# Patient Record
Sex: MALE | Race: WHITE | NOT HISPANIC OR LATINO | Employment: FULL TIME | URBAN - METROPOLITAN AREA
[De-identification: names, ages, dates, MRNs, and addresses within clinical notes are randomized per-mention and may not be internally consistent; named-entity substitution may affect disease eponyms.]

---

## 2023-09-07 PROBLEM — K21.9 ESOPHAGEAL REFLUX: Status: ACTIVE | Noted: 2023-09-07

## 2023-09-07 PROBLEM — E78.01 ESSENTIAL FAMILIAL HYPERCHOLESTEROLEMIA: Status: ACTIVE | Noted: 2023-09-07

## 2023-09-07 PROBLEM — R06.83 PRIMARY SNORING: Status: ACTIVE | Noted: 2023-09-07

## 2023-09-07 PROBLEM — G47.33 OBSTRUCTIVE SLEEP APNEA SYNDROME: Status: ACTIVE | Noted: 2023-09-07

## 2023-09-07 PROBLEM — H53.149 PHOTOPHOBIA: Status: ACTIVE | Noted: 2023-09-07

## 2023-09-07 PROBLEM — G47.10 HYPERSOMNOLENCE: Status: ACTIVE | Noted: 2023-09-07

## 2023-09-07 PROBLEM — H33.302 RETINAL DEFECT, LEFT: Status: ACTIVE | Noted: 2023-09-07

## 2023-09-07 PROBLEM — H52.7 REFRACTIVE ERROR: Status: ACTIVE | Noted: 2023-09-07

## 2023-09-07 PROBLEM — E78.5 HYPERLIPIDEMIA: Status: ACTIVE | Noted: 2023-09-07

## 2023-09-07 PROBLEM — E66.9 OBESITY (BMI 30-39.9): Status: ACTIVE | Noted: 2023-09-07

## 2023-09-07 PROBLEM — M25.569 ARTHRALGIA OF LOWER LEG: Status: ACTIVE | Noted: 2023-09-07

## 2023-09-07 PROBLEM — M54.42 LUMBAGO WITH SCIATICA, LEFT SIDE: Status: ACTIVE | Noted: 2023-09-07

## 2023-09-07 RX ORDER — IBUPROFEN 200 MG
TABLET ORAL
COMMUNITY

## 2023-12-27 ENCOUNTER — TELEPHONE (OUTPATIENT)
Dept: PRIMARY CARE | Facility: CLINIC | Age: 33
End: 2023-12-27
Payer: COMMERCIAL

## 2023-12-27 NOTE — TELEPHONE ENCOUNTER
"PT requests appt for cough, cramping after eating. Was sick \"a couple  weeks ago\". Pt top take covid test and advise of results. Covid tests negative. Confirmed appt w/ N Sánchez.   "

## 2023-12-28 ENCOUNTER — OFFICE VISIT (OUTPATIENT)
Dept: PRIMARY CARE | Facility: CLINIC | Age: 33
End: 2023-12-28
Payer: COMMERCIAL

## 2023-12-28 VITALS
HEART RATE: 84 BPM | SYSTOLIC BLOOD PRESSURE: 130 MMHG | BODY MASS INDEX: 38.08 KG/M2 | OXYGEN SATURATION: 95 % | DIASTOLIC BLOOD PRESSURE: 70 MMHG | TEMPERATURE: 99.8 F | WEIGHT: 273 LBS

## 2023-12-28 DIAGNOSIS — B34.9 VIRAL ILLNESS: Primary | ICD-10-CM

## 2023-12-28 PROCEDURE — 99213 OFFICE O/P EST LOW 20 MIN: CPT | Performed by: LICENSED PRACTICAL NURSE

## 2023-12-28 PROCEDURE — 87634 RSV DNA/RNA AMP PROBE: CPT | Performed by: LICENSED PRACTICAL NURSE

## 2023-12-28 RX ORDER — OSELTAMIVIR PHOSPHATE 75 MG/1
75 CAPSULE ORAL 2 TIMES DAILY
Qty: 10 CAPSULE | Refills: 0 | Status: SHIPPED | OUTPATIENT
Start: 2023-12-28 | End: 2024-01-02

## 2023-12-28 ASSESSMENT — ENCOUNTER SYMPTOMS
CHILLS: 1
LOSS OF SENSATION IN FEET: 0
FEVER: 1
SORE THROAT: 0
OCCASIONAL FEELINGS OF UNSTEADINESS: 0
COUGH: 1
SINUS PAIN: 0
FATIGUE: 1
DEPRESSION: 1

## 2023-12-28 ASSESSMENT — PATIENT HEALTH QUESTIONNAIRE - PHQ9
2. FEELING DOWN, DEPRESSED OR HOPELESS: SEVERAL DAYS
SUM OF ALL RESPONSES TO PHQ9 QUESTIONS 1 AND 2: 2
1. LITTLE INTEREST OR PLEASURE IN DOING THINGS: SEVERAL DAYS

## 2023-12-28 ASSESSMENT — PAIN SCALES - GENERAL: PAINLEVEL: 6

## 2023-12-28 NOTE — PROGRESS NOTES
[facility]: [facility]  PROGRESS NOTE      James Rosario is a 33 y.o. male that is presenting today for Follow-up (Chest pain when they cough, chills, dark brown phlegm, metallic taste in mouth when coughing, been going on for 2 days now, covid tested twice and came back neg ).      Subjective   Pt presents to the office today for concerns of a cough that began 2 days ago. He notes CP when he coughs and minimally when he does not cough. His cough is productive with a dark brown sputum. He had a fever of 101 last night and reports chills as well. He notes bodyaches yesterday that seemed to have resolved today he also notes feeling fatigued. He reports having N/V/D 2 weeks ago that has since passed. He has tried some Dayquil yesterday that did not seem to help very much. He tested himself for Covid x2, both tests were negative.       Review of Systems   Constitutional:  Positive for chills, fatigue and fever.        Bodyaches   HENT:  Negative for ear pain, sinus pain and sore throat.    Respiratory:  Positive for cough.    Cardiovascular:  Positive for chest pain.      Objective   Vitals:    12/28/23 0919   BP: 130/70   Pulse: 84   Temp: 37.7 °C (99.8 °F)   SpO2: 95%      Body mass index is 38.08 kg/m².  Physical Exam  Vitals reviewed.   HENT:      Ears:      Comments: Slightly erythemic right TM. Bilateral non occlusive cerumen. No tenderness      Nose:      Right Sinus: Frontal sinus tenderness present.      Mouth/Throat:      Mouth: Mucous membranes are moist.      Pharynx: Oropharynx is clear. No pharyngeal swelling or posterior oropharyngeal erythema.   Cardiovascular:      Rate and Rhythm: Normal rate and regular rhythm.   Pulmonary:      Effort: Pulmonary effort is normal.      Breath sounds: Normal breath sounds.       Diagnostic Results   Lab Results   Component Value Date    GLUCOSE 101 (H) 01/11/2023    CALCIUM 9.6 01/11/2023     01/11/2023    K 4.4 01/11/2023    CO2 23 (L)  "01/11/2023     01/11/2023    BUN 17 01/11/2023    CREATININE 0.6 01/11/2023     Lab Results   Component Value Date    ALT 45 (H) 01/11/2023    AST 29 01/11/2023    ALKPHOS 86 01/11/2023    BILITOT 0.2 01/11/2023     Lab Results   Component Value Date    WBC 10.5 01/11/2023    HGB 15.2 01/11/2023    HCT 45.5 01/11/2023    MCV 92.7 01/11/2023     01/11/2023     Lab Results   Component Value Date    CHOL 181 01/11/2023    CHOL 192 01/09/2020     Lab Results   Component Value Date    HDL 28 (L) 01/11/2023    HDL 29 (L) 01/09/2020     Lab Results   Component Value Date    LDLCALC 93 01/11/2023    LDLCALC 135 (H) 01/09/2020     Lab Results   Component Value Date    TRIG 299 (H) 01/11/2023    TRIG 138 01/09/2020     No components found for: \"CHOLHDL\"  Lab Results   Component Value Date    HGBA1C 5.9 01/09/2020     Other labs not included in the list above were reviewed either before or during this encounter.    History    Past Medical History:   Diagnosis Date    Other specified health status 10/11/2014    No pertinent past medical history     Past Surgical History:   Procedure Laterality Date    ADENOIDECTOMY  07/16/2012    Adenoidectomy    OTHER SURGICAL HISTORY  07/16/2012    Ear Surgery Eustachian Tube     Family History   Problem Relation Name Age of Onset    Sleep apnea Mother      Other (sciatica) Mother      Hypertension Mother      Pulmonary embolism Mother      Diabetes Father      Blood clot Father      Sleep apnea Brother      No Known Problems Son      Stroke Mother's Sister      Throat cancer Paternal Grandfather       Social History     Socioeconomic History    Marital status: Single     Spouse name: Not on file    Number of children: Not on file    Years of education: Not on file    Highest education level: Not on file   Occupational History    Not on file   Tobacco Use    Smoking status: Never    Smokeless tobacco: Never   Vaping Use    Vaping Use: Former    Quit date: 12/3/2023    Substances: " Nicotine   Substance and Sexual Activity    Alcohol use: Not Currently    Drug use: Not Currently     Types: Marijuana    Sexual activity: Not on file   Other Topics Concern    Not on file   Social History Narrative    Not on file     Social Determinants of Health     Financial Resource Strain: Not on file   Food Insecurity: Not on file   Transportation Needs: Not on file   Physical Activity: Not on file   Stress: Not on file   Social Connections: Not on file   Intimate Partner Violence: Not on file   Housing Stability: Not on file     No Known Allergies  Current Outpatient Medications on File Prior to Visit   Medication Sig Dispense Refill    ibuprofen 200 mg tablet 1 tablet as needed Orally every 6 hrs       No current facility-administered medications on file prior to visit.     Immunization History   Administered Date(s) Administered    DTP 1988, 1988, 02/17/1989, 02/05/1990    Hepatitis B vaccine, pediatric/adolescent (RECOMBIVAX, ENGERIX) 10/07/1998, 11/27/1998, 04/23/1999    Influenza, injectable, quadrivalent 01/07/2020    MMR vaccine, subcutaneous (MMR II) 11/17/1989, 10/07/1998    OPV 1988, 1988, 02/05/1990    Pfizer Purple Cap SARS-CoV-2 01/16/2021, 02/06/2021, 01/06/2022    Tdap vaccine, age 7 year and older (BOOSTRIX) 12/24/2016, 03/27/2018     Patient's medical history was reviewed and updated either before or during this encounter.       Assessment/Plan   Problem List Items Addressed This Visit    None  Visit Diagnoses       Viral illness    -  Primary    Relevant Medications    oseltamivir (Tamiflu) 75 mg capsule    Other Relevant Orders    Influenza A, and B PCR    RSV PCR            Cristy Pozo, APRN-CNP

## 2023-12-28 NOTE — PATIENT INSTRUCTIONS
It was nice meeting you today. I'm sorry you are having difficulty with your symptoms. I will test you for the flu and RSV, your results will take some time to return. In the meantime I will treat you for the flu. For you cough you can take OTC mucinex if you like. These should help you some. If you feel a sudden increase in your chest pain please call  [phone].

## 2023-12-29 LAB
FLUAV RNA RESP QL NAA+PROBE: DETECTED
FLUBV RNA RESP QL NAA+PROBE: NOT DETECTED
RSV RNA RESP QL NAA+PROBE: NOT DETECTED

## 2024-07-29 ASSESSMENT — ENCOUNTER SYMPTOMS
DIARRHEA: 0
VOMITING: 0
COUGH: 0
APPETITE CHANGE: 0
FEVER: 0
HEADACHES: 0
NAUSEA: 0
SHORTNESS OF BREATH: 0
CHILLS: 0

## 2024-07-29 NOTE — PROGRESS NOTES
[facility]: [facility]  PHYSICAL EXAM      James Rosario is a 33 y.o. male that is presenting today for Annual Exam (Stomach issues in November, stomach has not been the same since).    Assessment/Plan   Diagnoses and all orders for this visit:  Annual physical exam  -     CBC and Auto Differential; Future  -     Comprehensive Metabolic Panel; Future  -     Lipid Panel; Future  -     Hemoglobin A1C; Future  -     Vitamin D 25-Hydroxy,Total (for eval of Vitamin D levels); Future  -     TSH with reflex to Free T4 if abnormal; Future  Mixed hyperlipidemia  -     CBC and Auto Differential; Future  -     Comprehensive Metabolic Panel; Future  -     Lipid Panel; Future  Hyperglycemia  -     Hemoglobin A1C; Future  Vitamin D deficiency  -     Vitamin D 25-Hydroxy,Total (for eval of Vitamin D levels); Future  Chronic pain of both knees  -     XR knee 1-2 views bilateral; Future  Dyspepsia  -     omeprazole OTC (PriLOSEC OTC) 20 mg EC tablet; Take 1 tablet (20 mg) by mouth once daily. Do not crush, chew, or split.  -     dicyclomine (Bentyl) 20 mg tablet; Take 1 tablet (20 mg) by mouth 4 times a day as needed (abdominal pain or cramps).  -     Referral to Gastroenterology; Future  Other orders  -     Follow Up In Primary Care - Health Maintenance; Future    Would like to get the laboratory studies updated now.  Would like to start omeprazole daily for possible dyspepsia, dicyclomine as needed for the spasms.  Gave him a referral to gastroenterology.  His symptoms are definitely better at this point he is no longer losing weight but I told him that if he does not have continued improvement and certainly if the medications do not help him that he should see the gastroenterologist.    I would also like to x-ray the knees given the increased pain in the knees even after the weight loss.    Subjective   HPI  The patient is here for physical exam and follow up.  We reviewed the medical, family and social  history as outlined below.  We reviewed any currently prescribed medications.  We reviewed the screening and prevention schedule in detail.    The patient denies chest pain and shortness of breath.  No exertion-proved or anginal-type symptoms are reported.    Over the past several months he has been having stomach issues, stating that he feels like he gets cramps or spasms in the stomach.  The bowels do not really seem to be affected at all and he is not complaining of diarrhea.  Denies melena, hematochezia.  It has caused him to reduce when he is eating and he has lost quite a bit of weight.  He was 286 pounds in January 2023 and is at 269 pounds today.  He says the weight loss is definitely leveled off and his symptoms are much better.  However, he continues to very intermittently have the issues with stomach pain and cramping.  He has no typical symptoms of acid reflux.  He says that on 2 occasions he drank just a glass of milk and then a glass of water and it caused his stomach to hurt and he felt that he had to expel gas.    He is also had bilateral knee pain which has been ongoing for some time and he thought it would get better with the weight loss but it is actually worse.  Sometimes when he squats he feels like his knees are going to give out.  There is no preceding injury that he knows of.  He does not recall anybody in the family having had inflammatory arthritis or any bone or joint issues.    Review of Systems   Constitutional:  Positive for unexpected weight change. Negative for appetite change, chills and fever.   Respiratory:  Negative for cough and shortness of breath.    Cardiovascular:  Negative for chest pain.   Gastrointestinal:  Positive for abdominal pain. Negative for anal bleeding, blood in stool, constipation, diarrhea, nausea, rectal pain and vomiting.   Neurological:  Negative for headaches.   All other systems reviewed and are negative.     Objective   Vitals:    07/30/24 0935   BP:  122/80   Pulse: 80   Temp: 36.3 °C (97.4 °F)   SpO2: 95%     Body mass index is 36.48 kg/m².  Physical Exam  Vitals reviewed.   Constitutional:       General: He is not in acute distress.     Appearance: He is not toxic-appearing.   HENT:      Head: Normocephalic and atraumatic.      Mouth/Throat:      Mouth: Mucous membranes are moist.   Eyes:      Pupils: Pupils are equal, round, and reactive to light.   Cardiovascular:      Rate and Rhythm: Normal rate and regular rhythm.      Heart sounds: No murmur heard.  Pulmonary:      Breath sounds: Normal breath sounds. No wheezing, rhonchi or rales.   Abdominal:      General: There is no distension.      Palpations: Abdomen is soft.   Musculoskeletal:      Right lower leg: No edema.      Left lower leg: No edema.   Neurological:      General: No focal deficit present.      Mental Status: He is alert and oriented to person, place, and time.     He is full range of motion without crepitus.  No laxity.  Overall joints show very minimal degenerative changes, no synovitis is appreciated.    Diagnostic Results   Lab Results   Component Value Date    GLUCOSE 101 (H) 01/11/2023    CALCIUM 9.6 01/11/2023     01/11/2023    K 4.4 01/11/2023    CO2 23 (L) 01/11/2023     01/11/2023    BUN 17 01/11/2023    CREATININE 0.6 01/11/2023     Lab Results   Component Value Date    ALT 45 (H) 01/11/2023    AST 29 01/11/2023    ALKPHOS 86 01/11/2023    BILITOT 0.2 01/11/2023     Lab Results   Component Value Date    WBC 10.5 01/11/2023    HGB 15.2 01/11/2023    HCT 45.5 01/11/2023    MCV 92.7 01/11/2023     01/11/2023     Lab Results   Component Value Date    CHOL 181 01/11/2023    CHOL 192 01/09/2020     Lab Results   Component Value Date    HDL 28 (L) 01/11/2023    HDL 29 (L) 01/09/2020     Lab Results   Component Value Date    LDLCALC 93 01/11/2023    LDLCALC 135 (H) 01/09/2020     Lab Results   Component Value Date    TRIG 299 (H) 01/11/2023    TRIG 138 01/09/2020     No  "components found for: \"CHOLHDL\"  Lab Results   Component Value Date    HGBA1C 5.9 01/09/2020     Other labs not included in the list above were reviewed either before or during this encounter.    History   Past Medical History:   Diagnosis Date    Other specified health status 10/11/2014    No pertinent past medical history     Past Surgical History:   Procedure Laterality Date    ADENOIDECTOMY  07/16/2012    Adenoidectomy    OTHER SURGICAL HISTORY  07/16/2012    Ear Surgery Eustachian Tube     Family History   Problem Relation Name Age of Onset    Sleep apnea Mother      Other (sciatica) Mother      Hypertension Mother      Pulmonary embolism Mother      Diabetes Father      Blood clot Father      Sleep apnea Brother      No Known Problems Son      Stroke Mother's Sister      Throat cancer Paternal Grandfather       Social History     Socioeconomic History    Marital status: Single     Spouse name: Not on file    Number of children: Not on file    Years of education: Not on file    Highest education level: Not on file   Occupational History    Not on file   Tobacco Use    Smoking status: Never    Smokeless tobacco: Never   Vaping Use    Vaping status: Former    Quit date: 12/3/2023    Substances: Nicotine   Substance and Sexual Activity    Alcohol use: Not Currently    Drug use: Not Currently     Types: Marijuana    Sexual activity: Not on file   Other Topics Concern    Not on file   Social History Narrative    Not on file     Social Determinants of Health     Financial Resource Strain: Not on file   Food Insecurity: Not on file   Transportation Needs: Not on file   Physical Activity: Not on file   Stress: Not on file   Social Connections: Not on file   Intimate Partner Violence: Not on file   Housing Stability: Not on file     No Known Allergies  Current Outpatient Medications on File Prior to Visit   Medication Sig Dispense Refill    ibuprofen 200 mg tablet 1 tablet as needed Orally every 6 hrs       No current " facility-administered medications on file prior to visit.     Immunization History   Administered Date(s) Administered    DTP 1988, 1988, 02/17/1989, 02/05/1990    Hepatitis B vaccine, 19 yrs and under (RECOMBIVAX, ENGERIX) 10/07/1998, 11/27/1998, 04/23/1999    Influenza, injectable, quadrivalent 01/07/2020    MMR vaccine, subcutaneous (MMR II) 11/17/1989, 10/07/1998    OPV 1988, 1988, 02/05/1990    Pfizer Purple Cap SARS-CoV-2 01/16/2021, 02/06/2021, 01/06/2022    Tdap vaccine, age 7 year and older (BOOSTRIX, ADACEL) 12/24/2016, 03/27/2018     Patient's medical history was reviewed and updated either before or during this encounter.       Zan Schwarz MD

## 2024-07-30 ENCOUNTER — OFFICE VISIT (OUTPATIENT)
Dept: PRIMARY CARE | Facility: CLINIC | Age: 34
End: 2024-07-30
Payer: COMMERCIAL

## 2024-07-30 VITALS
HEART RATE: 80 BPM | SYSTOLIC BLOOD PRESSURE: 122 MMHG | TEMPERATURE: 97.4 F | BODY MASS INDEX: 36.44 KG/M2 | WEIGHT: 269 LBS | HEIGHT: 72 IN | DIASTOLIC BLOOD PRESSURE: 80 MMHG | OXYGEN SATURATION: 95 %

## 2024-07-30 DIAGNOSIS — R73.9 HYPERGLYCEMIA: ICD-10-CM

## 2024-07-30 DIAGNOSIS — E78.2 MIXED HYPERLIPIDEMIA: ICD-10-CM

## 2024-07-30 DIAGNOSIS — G89.29 CHRONIC PAIN OF BOTH KNEES: ICD-10-CM

## 2024-07-30 DIAGNOSIS — R10.13 DYSPEPSIA: ICD-10-CM

## 2024-07-30 DIAGNOSIS — Z00.00 ANNUAL PHYSICAL EXAM: Primary | ICD-10-CM

## 2024-07-30 DIAGNOSIS — M25.561 CHRONIC PAIN OF BOTH KNEES: ICD-10-CM

## 2024-07-30 DIAGNOSIS — E55.9 VITAMIN D DEFICIENCY: ICD-10-CM

## 2024-07-30 DIAGNOSIS — M25.562 CHRONIC PAIN OF BOTH KNEES: ICD-10-CM

## 2024-07-30 PROCEDURE — 3008F BODY MASS INDEX DOCD: CPT | Performed by: INTERNAL MEDICINE

## 2024-07-30 PROCEDURE — 1036F TOBACCO NON-USER: CPT | Performed by: INTERNAL MEDICINE

## 2024-07-30 PROCEDURE — 99395 PREV VISIT EST AGE 18-39: CPT | Performed by: INTERNAL MEDICINE

## 2024-07-30 RX ORDER — OMEPRAZOLE 20 MG/1
20 TABLET, DELAYED RELEASE ORAL DAILY
COMMUNITY
Start: 2024-07-30 | End: 2025-07-30

## 2024-07-30 RX ORDER — DICYCLOMINE HYDROCHLORIDE 20 MG/1
20 TABLET ORAL 4 TIMES DAILY PRN
Qty: 30 TABLET | Refills: 0 | Status: SHIPPED | OUTPATIENT
Start: 2024-07-30 | End: 2024-09-28

## 2024-07-30 ASSESSMENT — PATIENT HEALTH QUESTIONNAIRE - PHQ9
SUM OF ALL RESPONSES TO PHQ9 QUESTIONS 1 AND 2: 0
1. LITTLE INTEREST OR PLEASURE IN DOING THINGS: NOT AT ALL
2. FEELING DOWN, DEPRESSED OR HOPELESS: NOT AT ALL

## 2024-07-30 ASSESSMENT — ENCOUNTER SYMPTOMS
LOSS OF SENSATION IN FEET: 0
CONSTIPATION: 0
DEPRESSION: 0
ANAL BLEEDING: 0
ABDOMINAL PAIN: 1
BLOOD IN STOOL: 0
RECTAL PAIN: 0
OCCASIONAL FEELINGS OF UNSTEADINESS: 0
UNEXPECTED WEIGHT CHANGE: 1

## 2024-07-30 ASSESSMENT — PAIN SCALES - GENERAL: PAINLEVEL: 2

## 2025-07-27 ASSESSMENT — ENCOUNTER SYMPTOMS
VOMITING: 0
HEADACHES: 0
FEVER: 0
COUGH: 0
NAUSEA: 0
DIARRHEA: 0
CHILLS: 0
APPETITE CHANGE: 0
SHORTNESS OF BREATH: 0
ABDOMINAL PAIN: 0

## 2025-07-27 NOTE — PROGRESS NOTES
[facility]: [facility]  PHYSICAL EXAM      James Rosario is a 34 y.o. male that is presenting today for Annual Exam.    Assessment/Plan   Diagnoses and all orders for this visit:  Annual physical exam  -     CBC and Auto Differential; Future  -     Comprehensive Metabolic Panel; Future  -     Lipid Panel; Future  -     Hemoglobin A1C; Future  -     TSH with reflex to Free T4 if abnormal; Future  -     Vitamin D 25-Hydroxy,Total (for eval of Vitamin D levels); Future  Tobacco use  Other orders  -     Follow Up In Primary Care - Health Maintenance  -     Follow Up In Primary Care - Health Maintenance; Future    Discussed general diet lifestyle efforts, weight reduction efforts.  Advised regular cardiovascular exercise 3 to 4 days/week.  Discussed diet changes.  We discussed about smoking cessation strategies including using nicotine gum or lozenges when he is on break at work when he is likely to smoke.  Will obtain laboratory studies as ordered.    Subjective   HPI  The patient is here for physical exam and follow up.  We reviewed the medical, family and social history as outlined below.  We reviewed any currently prescribed medications.  We reviewed the screening and prevention schedule in detail.    Smokes less than a pack a day.  No exercise.  He does use marijuana about twice a week.    No chest pain or shortness of breath or any other concerns.    Occasional issues with GERD but it is rare.    Review of Systems   Constitutional:  Negative for appetite change, chills and fever.   Respiratory:  Negative for cough and shortness of breath.    Cardiovascular:  Negative for chest pain.   Gastrointestinal:  Negative for abdominal pain, diarrhea, nausea and vomiting.   Neurological:  Negative for headaches.   All other systems reviewed and are negative.     Objective   Vitals:    07/29/25 0922   BP: 134/78   Pulse:    Temp:    SpO2:      Body mass index is 35.53 kg/m².  Physical Exam  Vitals  "reviewed.   Constitutional:       General: He is not in acute distress.     Appearance: He is not toxic-appearing.   HENT:      Head: Normocephalic and atraumatic.      Mouth/Throat:      Mouth: Mucous membranes are moist.     Eyes:      Pupils: Pupils are equal, round, and reactive to light.       Cardiovascular:      Rate and Rhythm: Normal rate and regular rhythm.      Heart sounds: No murmur heard.  Pulmonary:      Breath sounds: Normal breath sounds. No wheezing, rhonchi or rales.   Abdominal:      General: Bowel sounds are normal. There is no distension.      Palpations: Abdomen is soft.     Musculoskeletal:      Right lower leg: No edema.      Left lower leg: No edema.     Neurological:      General: No focal deficit present.      Mental Status: He is alert and oriented to person, place, and time.       Diagnostic Results   Lab Results   Component Value Date    GLUCOSE 101 (H) 01/11/2023    CALCIUM 9.6 01/11/2023     01/11/2023    K 4.4 01/11/2023    CO2 23 (L) 01/11/2023     01/11/2023    BUN 17 01/11/2023    CREATININE 0.6 01/11/2023     Lab Results   Component Value Date    ALT 45 (H) 01/11/2023    AST 29 01/11/2023    ALKPHOS 86 01/11/2023    BILITOT 0.2 01/11/2023     Lab Results   Component Value Date    WBC 10.5 01/11/2023    HGB 15.2 01/11/2023    HCT 45.5 01/11/2023    MCV 92.7 01/11/2023     01/11/2023     Lab Results   Component Value Date    CHOL 181 01/11/2023    CHOL 192 01/09/2020     Lab Results   Component Value Date    HDL 28 (L) 01/11/2023    HDL 29 (L) 01/09/2020     Lab Results   Component Value Date    LDLCALC 93 01/11/2023    LDLCALC 135 (H) 01/09/2020     Lab Results   Component Value Date    TRIG 299 (H) 01/11/2023    TRIG 138 01/09/2020     No components found for: \"CHOLHDL\"  Lab Results   Component Value Date    HGBA1C 5.9 01/09/2020     Other labs not included in the list above were reviewed either before or during this encounter.    History   Medical " History[1]  Surgical History[2]  Family History[3]  Social History     Socioeconomic History    Marital status: Single     Spouse name: Not on file    Number of children: Not on file    Years of education: Not on file    Highest education level: Not on file   Occupational History    Not on file   Tobacco Use    Smoking status: Every Day     Current packs/day: 0.50     Types: Cigarettes    Smokeless tobacco: Never   Vaping Use    Vaping status: Former    Quit date: 12/3/2023    Substances: Nicotine   Substance and Sexual Activity    Alcohol use: Not Currently    Drug use: Yes     Frequency: 2.0 times per week     Types: Marijuana    Sexual activity: Not on file   Other Topics Concern    Not on file   Social History Narrative    Not on file     Social Drivers of Health     Financial Resource Strain: Not on file   Food Insecurity: Not on file   Transportation Needs: Not on file   Physical Activity: Not on file   Stress: Not on file   Social Connections: Not on file   Intimate Partner Violence: Not on file   Housing Stability: Not on file     Allergies[4]  Medications Ordered Prior to Encounter[5]  Immunization History   Administered Date(s) Administered    DTP 1988, 1988, 02/17/1989, 02/05/1990    Hepatitis B vaccine, 19 yrs and under (RECOMBIVAX, ENGERIX) 10/07/1998, 11/27/1998, 04/23/1999    Influenza, injectable, quadrivalent 01/07/2020    MMR vaccine, subcutaneous (MMR II) 11/17/1989, 10/07/1998    OPV 1988, 1988, 02/05/1990    Pfizer Purple Cap SARS-CoV-2 01/16/2021, 02/06/2021, 01/06/2022    Tdap vaccine, age 7 year and older (BOOSTRIX, ADACEL) 12/24/2016, 03/27/2018     Patient's medical history was reviewed and updated either before or during this encounter.       Zan Schwarz MD         [1]   Past Medical History:  Diagnosis Date    Other specified health status 10/11/2014    No pertinent past medical history   [2]   Past Surgical History:  Procedure Laterality Date     ADENOIDECTOMY  07/16/2012    Adenoidectomy    OTHER SURGICAL HISTORY  07/16/2012    Ear Surgery Eustachian Tube   [3]   Family History  Problem Relation Name Age of Onset    Sleep apnea Mother      Other (sciatica) Mother      Hypertension Mother      Pulmonary embolism Mother      Diabetes Father      Blood clot Father      Sleep apnea Brother      No Known Problems Son      Stroke Mother's Sister      Throat cancer Paternal Grandfather     [4] No Known Allergies  [5]   Current Outpatient Medications on File Prior to Visit   Medication Sig Dispense Refill    ibuprofen 200 mg tablet 1 tablet as needed Orally every 6 hrs      [DISCONTINUED] omeprazole OTC (PriLOSEC OTC) 20 mg EC tablet Take 1 tablet (20 mg) by mouth once daily. Do not crush, chew, or split. (Patient not taking: Reported on 7/29/2025)       No current facility-administered medications on file prior to visit.

## 2025-07-29 ENCOUNTER — OFFICE VISIT (OUTPATIENT)
Dept: PRIMARY CARE | Facility: CLINIC | Age: 35
End: 2025-07-29
Payer: COMMERCIAL

## 2025-07-29 VITALS
WEIGHT: 262 LBS | TEMPERATURE: 96.7 F | OXYGEN SATURATION: 95 % | SYSTOLIC BLOOD PRESSURE: 134 MMHG | HEART RATE: 76 BPM | BODY MASS INDEX: 35.49 KG/M2 | DIASTOLIC BLOOD PRESSURE: 78 MMHG | HEIGHT: 72 IN

## 2025-07-29 DIAGNOSIS — Z00.00 ANNUAL PHYSICAL EXAM: Primary | ICD-10-CM

## 2025-07-29 DIAGNOSIS — Z72.0 TOBACCO USE: ICD-10-CM

## 2025-07-29 PROCEDURE — 99395 PREV VISIT EST AGE 18-39: CPT | Performed by: INTERNAL MEDICINE

## 2025-07-29 PROCEDURE — 3008F BODY MASS INDEX DOCD: CPT | Performed by: INTERNAL MEDICINE

## 2025-07-29 ASSESSMENT — PATIENT HEALTH QUESTIONNAIRE - PHQ9
SUM OF ALL RESPONSES TO PHQ9 QUESTIONS 1 AND 2: 0
2. FEELING DOWN, DEPRESSED OR HOPELESS: NOT AT ALL
1. LITTLE INTEREST OR PLEASURE IN DOING THINGS: NOT AT ALL

## 2025-07-29 ASSESSMENT — PAIN SCALES - GENERAL: PAINLEVEL_OUTOF10: 0-NO PAIN
